# Patient Record
(demographics unavailable — no encounter records)

---

## 2024-11-19 NOTE — CONSULT LETTER
[Dear  ___] : Dear  [unfilled], [Consult Letter:] : I had the pleasure of evaluating your patient, [unfilled]. [Please see my note below.] : Please see my note below. [Sincerely,] : Sincerely, [DrJudson  ___] : Dr. GONZALEZ [DrJudson ___] : Dr. GONZALEZ [FreeTextEntry3] : Evelia Chinchilla MS DO\par  Breast Surgeon\par  [facility] \[place] [address]\par

## 2024-11-19 NOTE — PHYSICAL EXAM
[Normocephalic] : normocephalic [Atraumatic] : atraumatic [Supple] : supple [No Supraclavicular Adenopathy] : no supraclavicular adenopathy [Examined in the supine and seated position] : examined in the supine and seated position [Asymmetrical] : asymmetrical [No dominant masses] : no dominant masses in right breast  [No dominant masses] : no dominant masses left breast [No Nipple Retraction] : no left nipple retraction [No Nipple Discharge] : no left nipple discharge [No Axillary Lymphadenopathy] : no left axillary lymphadenopathy [No Edema] : no edema [No Rashes] : no rashes [No Ulceration] : no ulceration [de-identified] : healed periareolar incision, scar tissue medial and inferior periareolar, there is a 8cm x 0.5cm telengiectasia [de-identified] : L>R, in area of patient concern there is nodularity but no discrete mass

## 2024-11-19 NOTE — PHYSICAL EXAM
[Normocephalic] : normocephalic [Atraumatic] : atraumatic [Supple] : supple [No Supraclavicular Adenopathy] : no supraclavicular adenopathy [Examined in the supine and seated position] : examined in the supine and seated position [Asymmetrical] : asymmetrical [No dominant masses] : no dominant masses in right breast  [No dominant masses] : no dominant masses left breast [No Nipple Retraction] : no left nipple retraction [No Nipple Discharge] : no left nipple discharge [No Axillary Lymphadenopathy] : no left axillary lymphadenopathy [No Edema] : no edema [No Rashes] : no rashes [No Ulceration] : no ulceration [de-identified] : healed periareolar incision, scar tissue medial and inferior periareolar, there is a 8cm x 0.5cm telengiectasia [de-identified] : L>R, in area of patient concern there is nodularity but no discrete mass

## 2024-11-19 NOTE — HISTORY OF PRESENT ILLNESS
[FreeTextEntry1] : This is a 47 year old female referred by Dr. Wills for a right breast mass and right nipple discharge. She is s/p right 2-3:00 retro ultrasound biopsy at [facility] on 6/29/2020 of area of palpable concern. Pathology showed benign breast tissue with sclerosing adenosis and dilated ducts. A 6 month follow-up right targeted ultrasound has been recommended. She was seen by Dr. Han but her insurance is no longer accepted there and is now following with me.  She noticed the right breast mass June 2020 right before her imaging. The nipple discharge also started at that time. Denies any trauma, changes to herbs, diet or supplements. On breast MRI a suspicious appearing right central inner retro mass measuring 2.7x2.6x3.1cm was seen. Re-bx was recommended and completed of this mass at [facility] on 9/3/2020 pathology showed complex FA with UDH and PASH and felt to be concordant.  There was also an asymmetric mass in the UOQ spanning 8.5cm MRI bx was done on 9/10/2020 pathology showed a single focus of ALH and FCC with prominent interlobular fibrosis. She is s/p right ebbx 3 areas 11/5/2020 UOQ (was ALH) and 1.2mm DCIS intermediate to high grade was seen, negative margins and ALH  ER 99%, OK 99%, right breast 3:00 retro showed ADH, FCC, multiple confluent FA, UDH, and right terminal duct excision showed two adjacent FA.   She completed RT 1/21/2021. Is on Tamoxifen with Dr. Vail. Completed Pfizer left arm April 2021.  She is s/p  right breast 8N1, 10N3, 10N7, 3N5 bx on 5/19/2021. She is s/p right breast 8:00 ebbx  7/1/2021 path is benign. She developed a 10-15% PTX post op and was treated with observation. She is on tamoxifen and is now on every other day due to brain fog. She is concerned about her memory. She is also c/o left breast lumpiness within past month.  Katheryn has no breast complaints today. She dc Tamoxifen due to joint pain August 2024.  She saw neurology for her brain fog and was placed on Naceytl-cysteine.  She does SBE.  She has noticed a change in her breast or a breast lump on right. Left has lump in UIQ. Nontender She has not noticed a change in her nipple or nipple area. She has not noticed a change in the skin of the breast. She is not experiencing nipple discharge. She is not experiencing breast pain. She has not noticed a lump or lymph node under the armpit.   BREAST CANCER RISK FACTORS Menarche:  13 Date of LMP: 10/2024 Menopause: pre Grav: 1     Para: 1 Age at first live birth: 31 Nursed: no Hysterectomy: no Oophorectomy: no  OCP: no HRT: no Last pap/pelvic exam: 05/2024 WNL  Related Fam HX:  Breast CA @58 Ashkenazi: no Mastery risk assessment: n/a BRCA testing: yes DALI VUS ([id number]) Bra size: 30 J/K  Last mammogram:  05/15/2024 dx LEFT           Location: [facility] Report reviewed.                                 Images reviewed on PACS Results: Birads 2  There are scattered areas of fibroglandular density. no evidence of malignancy. An asymmetry seen in the left inner breast on the full field CC view effaces on spot imaging and is compatible with benign overlapping fibroglandular tissue and ligaments.  Last ultrasound: 05/15/2024              Location: [facility]  Report reviewed.                                 Images reviewed on PACS Results: BIRADS 2  No evidence of malignancy  Last MRI: 11/04/2024                                        Location: [facility] Report reviewed. BIRADS 2 No MRI evidence of malignancy.

## 2024-11-19 NOTE — HISTORY OF PRESENT ILLNESS
[FreeTextEntry1] : This is a 47 year old female referred by Dr. Wills for a right breast mass and right nipple discharge. She is s/p right 2-3:00 retro ultrasound biopsy at [facility] on 6/29/2020 of area of palpable concern. Pathology showed benign breast tissue with sclerosing adenosis and dilated ducts. A 6 month follow-up right targeted ultrasound has been recommended. She was seen by Dr. Han but her insurance is no longer accepted there and is now following with me.  She noticed the right breast mass June 2020 right before her imaging. The nipple discharge also started at that time. Denies any trauma, changes to herbs, diet or supplements. On breast MRI a suspicious appearing right central inner retro mass measuring 2.7x2.6x3.1cm was seen. Re-bx was recommended and completed of this mass at [facility] on 9/3/2020 pathology showed complex FA with UDH and PASH and felt to be concordant.  There was also an asymmetric mass in the UOQ spanning 8.5cm MRI bx was done on 9/10/2020 pathology showed a single focus of ALH and FCC with prominent interlobular fibrosis. 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   She completed RT 1/21/2021. Is on Tamoxifen with Dr. Vail. Completed Pfizer left arm April 2021.  She is s/p  right breast 8N1, 10N3, 10N7, 3N5 bx on 5/19/2021. She is s/p right breast 8:00 ebbx  7/1/2021 path is benign. She developed a 10-15% PTX post op and was treated with observation. She is on tamoxifen and is now on every other day due to brain fog. She is concerned about her memory. She is also c/o left breast lumpiness within past month.  Katheryn has no breast complaints today. She dc Tamoxifen due to joint pain August 2024.  She saw neurology for her brain fog and was placed on Naceytl-cysteine.  She does SBE.  She has noticed a change in her breast or a breast lump on right. Left has lump in UIQ. Nontender She has not noticed a change in her nipple or nipple area. She has not noticed a change in the skin of the breast. She is not experiencing nipple discharge. She is not experiencing breast pain. She has not noticed a lump or lymph node under the armpit.   BREAST CANCER RISK FACTORS Menarche:  13 Date of LMP: 10/2024 Menopause: pre Grav: 1     Para: 1 Age at first live birth: 31 Nursed: no Hysterectomy: no Oophorectomy: no  OCP: no HRT: no Last pap/pelvic exam: 05/2024 WNL  Related Fam HX:  Breast CA @58 Ashkenazi: no Mastery risk assessment: n/a BRCA testing: yes DALI VUS ([id number].[id number]) Bra size: 30 J/K  Last mammogram:  05/15/2024 dx LEFT           Location: [facility] Report reviewed.                                 Images reviewed on PACS Results: Birads 2  There are scattered areas of fibroglandular density. no evidence of malignancy. An asymmetry seen in the left inner breast on the full field CC view effaces on spot imaging and is compatible with benign overlapping fibroglandular tissue and ligaments.  Last ultrasound: 05/15/2024              Location: [facility]  Report reviewed.                                 Images reviewed on PACS Results: BIRADS 2  No evidence of malignancy  Last MRI: 11/04/2024                                        Location: [facility] Report reviewed. BIRADS 2 No MRI evidence of malignancy.

## 2024-11-19 NOTE — ASSESSMENT
[FreeTextEntry1] : 48 yo female with right breast DCIS, ER+, high risk  cont surveillance per Dr. Vail plan MRI 11/2025 plan mg/sono 5/2025 We reviewed risk reduction strategies including maintaining a BMI <25, limiting red meat intake and alcoholic beverages to 3 per week and exercise (150 min/ week low intensity or 75 min/week high intensity). And maintaining a normal vitamin D level.  f/u with me 6 months She knows to call or return sooner should any concerns or questions arise.

## 2024-11-19 NOTE — CONSULT LETTER
[Dear  ___] : Dear  [unfilled], [Consult Letter:] : I had the pleasure of evaluating your patient, [unfilled]. [Please see my note below.] : Please see my note below. [Sincerely,] : Sincerely, [DrJudson  ___] : Dr. GONZALEZ [DrJudson ___] : Dr. GONZALEZ [FreeTextEntry3] : Evelia Chinchilla MS DO\par  Breast Surgeon\par  [facility] \[place] [address]\par

## 2024-11-19 NOTE — ASSESSMENT
[FreeTextEntry1] : 46 yo female with right breast DCIS, ER+, high risk  cont surveillance per Dr. Vail plan MRI 11/2025 plan mg/sono 5/2025 We reviewed risk reduction strategies including maintaining a BMI <25, limiting red meat intake and alcoholic beverages to 3 per week and exercise (150 min/ week low intensity or 75 min/week high intensity). And maintaining a normal vitamin D level.  f/u with me 6 months She knows to call or return sooner should any concerns or questions arise.

## 2025-04-01 NOTE — PHYSICAL EXAM
[Fully active, able to carry on all pre-disease performance without restriction] : Status 0 - Fully active, able to carry on all pre-disease performance without restriction [Normal] : affect appropriate [de-identified] : b/l breast symmetrical and dense R>L. no masses or adenopathy in either breast appreciated, no skin lesions. no axillary lymphadenopathy bl.

## 2025-04-01 NOTE — PHYSICAL EXAM
[Fully active, able to carry on all pre-disease performance without restriction] : Status 0 - Fully active, able to carry on all pre-disease performance without restriction [Normal] : affect appropriate [de-identified] : b/l breast symmetrical and dense R>L. no masses or adenopathy in either breast appreciated, no skin lesions. no axillary lymphadenopathy bl.

## 2025-04-01 NOTE — HISTORY OF PRESENT ILLNESS
[de-identified] : This is a 46-year-old woman who presented in June 2020 with a right palpable breast mass and bloody right nipple discharge.  She underwent a mammogram and ultrasound at [facility] which showed a suspicious area in the retroareolar region of the right breast.  Ultrasound-guided biopsy was performed on June 29, 2020 revealing for sclerosing adenosis and dilated ducts.  Patient had not had any prior breast imaging nor any prior breast complaints.  She was initially seen by another surgeon and MRI was performed of the breast on August 2020.  MRI was revealing for a 2.7 x 2.6 x 3.1 cm mass recommended rebiopsy there was also non-mass-like enhancement in the upper outer quadrant measuring 8.5 cm.  On September 3 she had rebiopsy of the retroareolar area revealing for usual ductal hyperplasia and pseudoangiomatous stromal hyperplasia) PASH), fibroadenoma with adenosis and cysts  Biopsy of the upper outer quadrant on September 11, 2020 was revealing for atypical lobular hyperplasia, fibrocystic change, sclerosing adenosis and fibrosis no evidence of malignancy  She then went on to have an excision on November 5, 2020 with Dr. Nichols, this was performed of the upper outer quadrant and was revealing for residual atypical lobular hyperplasia as well as ductal carcinoma in situ measuring 1.2 mm ER strongly positive at 99% UT positive at 99%.,  There was necrosis, intermediate to high-grade.  Patient has a pretty extensive family history of breast cancer on her father side including an aunt with breast cancer at the age of 58 a second degree cousin with breast cancer at age of 36 and 2 great aunts one with breast cancer and one with breast cancer and ovarian cancer.  Patient has been referred to genetic counseling but no testing has been performed as of yet.  Patient has no family history of DVT PE or stroke nor has she ever had any incidence of thrombosis.  finished rt end of jan 2021 , last week of jan 2021  started tamoxifen   ultrasound guided biopsy  done in may 2021 - benign right breast 8:00 and 2  at 10 o'clock position  will be going for another lumpectomy july 1st  genetics showed DALI mut VUS  [de-identified] : Patient seen and examined today for routine follow up for the management of DCIS. Completed Tamoxifen August 2024. Reports joints pains being mostly gone. Continues to have brain fog but not worse.  Mammo/Sono 5/2024, scheduled 5/2025 MRI 11/2024 MRI abd - 02/2024 [facility] 2022 Dr. Wagner; follow up in 10 years GYN UTD Dr. Fontana

## 2025-04-01 NOTE — ASSESSMENT
[FreeTextEntry1] : Ms. Saravia is a 46 year-old premenopausal woman who presents with a palpable breast mass and nipple discharge, found to have DCIS of the right breast ER/RI +99% and 99% respectively. She is status post lumpectomy and presents for medical oncology discussion today.Patient is also positive for pseudoangiomatous stromal hyperplasia and atypical lobular hyperplasia  #DCIS/ALH  - Right breast ER/RI +99% and 99% respectively .Patient is also positive for pseudoangiomatous stromal hyperplasia and atypical lobular hyperplasia - s/p lumpectomy  - Completed RT in 1/2021 - Started tamoxifen in late 1/2021 - Lexapro may lead to lower doses of the active metabolite endoxifen pt is aware - could not tolerate side effect of tamoxifen. stopped in 8/2024 continues to follow with Dr. Chinchilla. Reviewed note from 11/2024 plan MRI 11/2025 plan mammo/sono 5/2025  #Vitamin D deficiency - Cont vit d - Repeat levels   #Genetics  - DALI VUS - Close monitoring with mammogram and MRI alternating - MRI abdomen 1/2022 - indeterminant liver lesion- repeat MRI with/wo EOVIST contrast. - 2/6/24 MRI abd - negative, [facility] of liver  #b/l hip pain (since aug 2022) + left shoulder pain  - 8/2022 X ray hip shows no evidence of acute fracture or dislocation. - Stable   #Brain fog  - will be stopping tamoxifen   #Health Maintenance  - Mammo/Sono 5/24 BIRADS 2. due 5/2025 - MRI 11/2024 BIRADS 2  - MRI abd - 2/24 - as above - [facility] 2022 Dr. Wagner  - GYN UTD Dr. Prescott   RTC in 12 months with cbc with diff, cmp, vit D.

## 2025-04-01 NOTE — PHYSICAL EXAM
[Fully active, able to carry on all pre-disease performance without restriction] : Status 0 - Fully active, able to carry on all pre-disease performance without restriction [Normal] : affect appropriate [de-identified] : b/l breast symmetrical and dense R>L. no masses or adenopathy in either breast appreciated, no skin lesions. no axillary lymphadenopathy bl.

## 2025-04-01 NOTE — HISTORY OF PRESENT ILLNESS
[de-identified] : This is a 46-year-old woman who presented in June 2020 with a right palpable breast mass and bloody right nipple discharge.  She underwent a mammogram and ultrasound at [facility] which showed a suspicious area in the retroareolar region of the right breast.  Ultrasound-guided biopsy was performed on June 29, 2020 revealing for sclerosing adenosis and dilated ducts.  Patient had not had any prior breast imaging nor any prior breast complaints.  She was initially seen by another surgeon and MRI was performed of the breast on August 2020.  MRI was revealing for a 2.7 x 2.6 x 3.1 cm mass recommended rebiopsy there was also non-mass-like enhancement in the upper outer quadrant measuring 8.5 cm.  On September 3 she had rebiopsy of the retroareolar area revealing for usual ductal hyperplasia and pseudoangiomatous stromal hyperplasia) PASH), fibroadenoma with adenosis and cysts  Biopsy of the upper outer quadrant on September 11, 2020 was revealing for atypical lobular hyperplasia, fibrocystic change, sclerosing adenosis and fibrosis no evidence of malignancy  She then went on to have an excision on November 5, 2020 with Dr. Nichols, this was performed of the upper outer quadrant and was revealing for residual atypical lobular hyperplasia as well as ductal carcinoma in situ measuring 1.2 mm ER strongly positive at 99% TN positive at 99%.,  There was necrosis, intermediate to high-grade.  Patient has a pretty extensive family history of breast cancer on her father side including an aunt with breast cancer at the age of 58 a second degree cousin with breast cancer at age of 36 and 2 great aunts one with breast cancer and one with breast cancer and ovarian cancer.  Patient has been referred to genetic counseling but no testing has been performed as of yet.  Patient has no family history of DVT PE or stroke nor has she ever had any incidence of thrombosis.  finished rt end of jan 2021 , last week of jan 2021  started tamoxifen   ultrasound guided biopsy  done in may 2021 - benign right breast 8:00 and 2  at 10 o'clock position  will be going for another lumpectomy july 1st  genetics showed DALI mut VUS  [de-identified] : Patient seen and examined today for routine follow up for the management of DCIS. Completed Tamoxifen August 2024. Reports joints pains being mostly gone. Continues to have brain fog but not worse.  Mammo/Sono 5/2024, scheduled 5/2025 MRI 11/2024 MRI abd - 02/2024 [facility] 2022 Dr. Wagner; follow up in 10 years GYN UTD Dr. Fontana

## 2025-04-01 NOTE — REVIEW OF SYSTEMS
[Negative] : Allergic/Immunologic [FreeTextEntry9] : joint pain worsening - specifically R hip  [Patient Intake Form Reviewed] : Patient intake form was reviewed [Night Sweats] : no night sweats [Fatigue] : no fatigue [Joint Pain] : no joint pain [Joint Stiffness] : no joint stiffness [Muscle Pain] : no muscle pain [FreeTextEntry2] : brain fog improved on NAC [de-identified] : brain fog

## 2025-04-01 NOTE — REVIEW OF SYSTEMS
[Negative] : Allergic/Immunologic [FreeTextEntry9] : joint pain worsening - specifically R hip  [Patient Intake Form Reviewed] : Patient intake form was reviewed [Night Sweats] : no night sweats [Fatigue] : no fatigue [Joint Pain] : no joint pain [Joint Stiffness] : no joint stiffness [Muscle Pain] : no muscle pain [FreeTextEntry2] : brain fog improved on NAC [de-identified] : brain fog

## 2025-04-01 NOTE — BEGINNING OF VISIT
[0] : 2) Feeling down, depressed, or hopeless: Not at all (0) [PHQ-2 Negative] : PHQ-2 Negative [Pain Scale: ___] : On a scale of 1-10, today the patient's pain is a(n) [unfilled]. [Former] : Former [> 15 Years] : > 15 Years [Date Discussed (MM/DD/YY): ___] : Discussed: [unfilled] [Patient/Caregiver not ready to engage] : Patient/Caregiver not ready to engage [[id number]] : 0 [de-identified] : quit about 17 years ago

## 2025-04-01 NOTE — ASSESSMENT
[FreeTextEntry1] : Ms. Saravia is a 46 year-old premenopausal woman who presents with a palpable breast mass and nipple discharge, found to have DCIS of the right breast ER/KS +99% and 99% respectively. She is status post lumpectomy and presents for medical oncology discussion today.Patient is also positive for pseudoangiomatous stromal hyperplasia and atypical lobular hyperplasia  #DCIS/ALH  - Right breast ER/KS +99% and 99% respectively .Patient is also positive for pseudoangiomatous stromal hyperplasia and atypical lobular hyperplasia - s/p lumpectomy  - Completed RT in 1/2021 - Started tamoxifen in late 1/2021 - Lexapro may lead to lower doses of the active metabolite endoxifen pt is aware - could not tolerate side effect of tamoxifen. stopped in 8/2024 continues to follow with Dr. Chinchilla. Reviewed note from 11/2024 plan MRI 11/2025 plan mammo/sono 5/2025  #Vitamin D deficiency - Cont vit d - Repeat levels   #Genetics  - DALI VUS - Close monitoring with mammogram and MRI alternating - MRI abdomen 1/2022 - indeterminant liver lesion- repeat MRI with/wo EOVIST contrast. - 2/6/24 MRI abd - negative, FNH of liver  #b/l hip pain (since aug 2022) + left shoulder pain  - 8/2022 X ray hip shows no evidence of acute fracture or dislocation. - Stable   #Brain fog  - will be stopping tamoxifen   #Health Maintenance  - Mammo/Sono 5/24 BIRADS 2. due 5/2025 - MRI 11/2024 BIRADS 2  - MRI abd - 2/24 - as above - [facility] 2022 Dr. Wagner  - GYN UTD Dr. Prescott   RTC in 12 months with cbc with diff, cmp, vit D.

## 2025-04-01 NOTE — ASSESSMENT
[FreeTextEntry1] : Ms. Saravia is a 46 year-old premenopausal woman who presents with a palpable breast mass and nipple discharge, found to have DCIS of the right breast ER/ID +99% and 99% respectively. She is status post lumpectomy and presents for medical oncology discussion today.Patient is also positive for pseudoangiomatous stromal hyperplasia and atypical lobular hyperplasia  #DCIS/ALH  - Right breast ER/ID +99% and 99% respectively .Patient is also positive for pseudoangiomatous stromal hyperplasia and atypical lobular hyperplasia - s/p lumpectomy  - Completed RT in 1/2021 - Started tamoxifen in late 1/2021 - Lexapro may lead to lower doses of the active metabolite endoxifen pt is aware - could not tolerate side effect of tamoxifen. stopped in 8/2024 continues to follow with Dr. Chinchilla. Reviewed note from 11/2024 plan MRI 11/2025 plan mammo/sono 5/2025  #Vitamin D deficiency - Cont vit d - Repeat levels   #Genetics  - DALI VUS - Close monitoring with mammogram and MRI alternating - MRI abdomen 1/2022 - indeterminant liver lesion- repeat MRI with/wo EOVIST contrast. - 2/6/24 MRI abd - negative, FNH of liver  #b/l hip pain (since aug 2022) + left shoulder pain  - 8/2022 X ray hip shows no evidence of acute fracture or dislocation. - Stable   #Brain fog  - will be stopping tamoxifen   #Health Maintenance  - Mammo/Sono 5/24 BIRADS 2. due 5/2025 - MRI 11/2024 BIRADS 2  - MRI abd - 2/24 - as above - [facility] 2022 Dr. Wagner  - GYN UTD Dr. Prescott   RTC in 12 months with cbc with diff, cmp, vit D.

## 2025-04-01 NOTE — BEGINNING OF VISIT
[0] : 2) Feeling down, depressed, or hopeless: Not at all (0) [PHQ-2 Negative] : PHQ-2 Negative [Pain Scale: ___] : On a scale of 1-10, today the patient's pain is a(n) [unfilled]. [Former] : Former [> 15 Years] : > 15 Years [Date Discussed (MM/DD/YY): ___] : Discussed: [unfilled] [Patient/Caregiver not ready to engage] : Patient/Caregiver not ready to engage [[id number]] : 0 [de-identified] : quit about 17 years ago

## 2025-04-01 NOTE — HISTORY OF PRESENT ILLNESS
[de-identified] : This is a 46-year-old woman who presented in June 2020 with a right palpable breast mass and bloody right nipple discharge.  She underwent a mammogram and ultrasound at [facility] which showed a suspicious area in the retroareolar region of the right breast.  Ultrasound-guided biopsy was performed on June 29, 2020 revealing for sclerosing adenosis and dilated ducts.  Patient had not had any prior breast imaging nor any prior breast complaints.  She was initially seen by another surgeon and MRI was performed of the breast on August 2020.  MRI was revealing for a 2.7 x 2.6 x 3.1 cm mass recommended rebiopsy there was also non-mass-like enhancement in the upper outer quadrant measuring 8.5 cm.  On September 3 she had rebiopsy of the retroareolar area revealing for usual ductal hyperplasia and pseudoangiomatous stromal hyperplasia) PASH), fibroadenoma with adenosis and cysts  Biopsy of the upper outer quadrant on September 11, 2020 was revealing for atypical lobular hyperplasia, fibrocystic change, sclerosing adenosis and fibrosis no evidence of malignancy  She then went on to have an excision on November 5, 2020 with Dr. Nichols, this was performed of the upper outer quadrant and was revealing for residual atypical lobular hyperplasia as well as ductal carcinoma in situ measuring 1.2 mm ER strongly positive at 99% GA positive at 99%.,  There was necrosis, intermediate to high-grade.  Patient has a pretty extensive family history of breast cancer on her father side including an aunt with breast cancer at the age of 58 a second degree cousin with breast cancer at age of 36 and 2 great aunts one with breast cancer and one with breast cancer and ovarian cancer.  Patient has been referred to genetic counseling but no testing has been performed as of yet.  Patient has no family history of DVT PE or stroke nor has she ever had any incidence of thrombosis.  finished rt end of jan 2021 , last week of jan 2021  started tamoxifen   ultrasound guided biopsy  done in may 2021 - benign right breast 8:00 and 2  at 10 o'clock position  will be going for another lumpectomy july 1st  genetics showed DALI mut VUS  [de-identified] : Patient seen and examined today for routine follow up for the management of DCIS. Completed Tamoxifen August 2024. Reports joints pains being mostly gone. Continues to have brain fog but not worse.  Mammo/[facility] 5/2024, scheduled 5/2025 MRI 11/2024 MRI abd - 02/2024 [facility] 2022 Dr. Wagner; follow up in 10 years GYN UTD Dr. Fontana

## 2025-04-01 NOTE — BEGINNING OF VISIT
[0] : 2) Feeling down, depressed, or hopeless: Not at all (0) [PHQ-2 Negative] : PHQ-2 Negative [Pain Scale: ___] : On a scale of 1-10, today the patient's pain is a(n) [unfilled]. [Former] : Former [> 15 Years] : > 15 Years [Date Discussed (MM/DD/YY): ___] : Discussed: [unfilled] [Patient/Caregiver not ready to engage] : Patient/Caregiver not ready to engage [[id number]] : 0 [de-identified] : quit about 17 years ago